# Patient Record
(demographics unavailable — no encounter records)

---

## 2025-01-07 NOTE — ASSESSMENT
[FreeTextEntry1] : # CRC screening - Average risk. Discussed different screening modalities including stool testing and colonoscopy. Discussed the risks of benefits of the various screening options. Patient elected to proceed with a colonoscopy. The risks, benefits and alternatives of the procedure were discussed with the patient in detail. Risks include bleeding, infection, bowel perforation, adverse reaction to sedation and missed lesions. All questions were answered. The patient expressed understanding of these risks and is agreeable to proceed. # Risky EtOH use - exceeding daily and weekly limit. Discussed the possible implications of excessive EtOH use including malignancy and cirrhosis. Patient will try to minimize use, with goal of one or less glass of wine per day for start.  Plan: - Colonoscopy  - Prep sent (patient to take MiraLAX nightly the week prior to colonoscopy as well) - Lifestyle changes as above

## 2025-01-07 NOTE — HISTORY OF PRESENT ILLNESS
[FreeTextEntry1] : Ms. Cortes is a 46yo F with no significant PMH who presents for colon cancer screening.  Patient is due for colorectal cancer screening. No family history of colorectal cancer or IBD. No blood in stools. No weight loss. No change in bowel habits or stool consistency. Having regular bowel movement every 2-3 days ([place] 3-4). No abdominal pain.  Patient reports risky EtOH use, with 2 glasses of wine per day and sometimes more over the weekend. No known liver disease. Labs on HIE reviewed - normal CBC and CMP.

## 2025-01-07 NOTE — PHYSICAL EXAM
[Alert] : alert [Well Developed] : well developed [Sclera] : the sclera and conjunctiva were normal [Hearing Threshold Finger Rub Not [place]] : hearing was normal [Normal Appearance] : the appearance of the neck was normal [No Respiratory Distress] : no respiratory distress [Heart Rate And Rhythm] : heart rate was normal and rhythm regular [Abdomen Tenderness] : non-tender [Bowel Sounds] : normal bowel sounds [No Masses] : no abdominal mass palpated [Abdomen Soft] : soft [] : no hepatosplenomegaly [No Focal Deficits] : no focal deficits [Oriented To Time, Place, And Person] : oriented to person, place, and time